# Patient Record
Sex: FEMALE | Race: WHITE | NOT HISPANIC OR LATINO | URBAN - METROPOLITAN AREA
[De-identification: names, ages, dates, MRNs, and addresses within clinical notes are randomized per-mention and may not be internally consistent; named-entity substitution may affect disease eponyms.]

---

## 2018-06-26 ENCOUNTER — APPOINTMENT (RX ONLY)
Dept: URBAN - METROPOLITAN AREA CLINIC 23 | Facility: CLINIC | Age: 58
Setting detail: DERMATOLOGY
End: 2018-06-26

## 2018-06-26 DIAGNOSIS — L82.0 INFLAMED SEBORRHEIC KERATOSIS: ICD-10-CM

## 2018-06-26 DIAGNOSIS — Z41.9 ENCOUNTER FOR PROCEDURE FOR PURPOSES OTHER THAN REMEDYING HEALTH STATE, UNSPECIFIED: ICD-10-CM

## 2018-06-26 PROCEDURE — 17110 DESTRUCTION B9 LES UP TO 14: CPT

## 2018-06-26 PROCEDURE — ? DYSPORT

## 2018-06-26 PROCEDURE — ? LIQUID NITROGEN

## 2018-06-26 PROCEDURE — ? FILLERS

## 2018-06-26 PROCEDURE — ? BENIGN DESTRUCTION

## 2018-06-26 ASSESSMENT — LOCATION SIMPLE DESCRIPTION DERM
LOCATION SIMPLE: RIGHT CHEEK
LOCATION SIMPLE: CHEST
LOCATION SIMPLE: INFERIOR FOREHEAD
LOCATION SIMPLE: ABDOMEN
LOCATION SIMPLE: RIGHT THIGH

## 2018-06-26 ASSESSMENT — LOCATION DETAILED DESCRIPTION DERM
LOCATION DETAILED: INFERIOR MID FOREHEAD
LOCATION DETAILED: RIGHT ANTERIOR DISTAL THIGH
LOCATION DETAILED: MIDDLE STERNUM
LOCATION DETAILED: RIGHT CENTRAL MALAR CHEEK
LOCATION DETAILED: RIGHT LATERAL ABDOMEN

## 2018-06-26 ASSESSMENT — LOCATION ZONE DERM
LOCATION ZONE: LEG
LOCATION ZONE: FACE
LOCATION ZONE: TRUNK

## 2018-06-26 NOTE — PROCEDURE: FILLERS
Dorsal Hands Filler  Volume In Cc: 0
Lot #: [id number] [facility]
Post-Care Instructions: Patient instructed to apply ice to reduce swelling.
Additional Area 1 Location: perioral fine lines, smile lines (cheeks), fine lines around eyes
Price (Use Numbers Only, No Special Characters Or $): 0.00
Expiration Date (Month Year): 11/30/2020
Additional Area 5 Location: perioral fine lines, glabella, cheeks, lateral mouth
Anesthesia Volume In Cc: 0.2
Additional Area 2 Location: cheeks
Additional Area 4 Location: fine line cheeks diluted with 0.2 lido
Consent: Written consent obtained. Risks include but not limited to bruising, beading, irregular texture, ulceration, infection, allergic reaction, scar formation, incomplete augmentation, temporary nature, procedural pain.
Lot #: [id number]
Map Statment: See [address] for Complete Details
Additional Area 3 Location: lateral eyes, nasal root
Additional Area 1 Location: lateral mouth, cheeks and lateral jawline
Additional Area 1 Volume In Cc: 1
Use Map Statement For Sites (Optional): No
Filler: Allison Padilla
Additional Area 3 Location: lateral mouth, cheeks
Additional Area 2 Location: lateral mouth,cheeks
Expiration Date (Month Year): 12/20/2019
Additional Anesthesia Volume In Cc: 6
Detail Level: Zone

## 2018-06-26 NOTE — PROCEDURE: BENIGN DESTRUCTION
Post-Care Instructions: I reviewed with the patient in detail post-care instructions. Patient is to wear sunprotection, and avoid picking at any of the treated lesions. Pt may apply Vaseline to crusted or scabbing areas.
Medical Necessity Information: It is in your best interest to select a reason for this procedure from the list below. All of these items fulfill various CMS LCD requirements except the new and changing color options.
Medical Necessity Clause: This procedure was medically necessary because the lesions that were treated were:
Add 52 Modifier (Optional): no
Anesthesia Volume In Cc: 0.5
Treatment Number (Will Not Render If 0): 0
Consent: The patient's consent was obtained including but not limited to risks of crusting, scabbing, blistering, scarring, darker or lighter pigmentary change, recurrence, incomplete removal and infection.
Detail Level: Zone

## 2018-06-26 NOTE — PROCEDURE: LIQUID NITROGEN
Detail Level: Zone
Include Z78.9 (Other Specified Conditions Influencing Health Status) As An Associated Diagnosis?: No
Medical Necessity Information: It is in your best interest to select a reason for this procedure from the list below. All of these items fulfill various CMS LCD requirements except the new and changing color options.
Post-Care Instructions: I reviewed with the patient in detail post-care instructions. Patient is to wear sunprotection, and avoid picking at any of the treated lesions. Pt may apply Vaseline to crusted or scabbing areas.
Medical Necessity Clause: This procedure was medically necessary because the lesions that were treated were:
Consent: The patient's consent was obtained including but not limited to risks of crusting, scabbing, blistering, scarring, darker or lighter pigmentary change, recurrence, incomplete removal and infection.

## 2018-06-26 NOTE — PROCEDURE: DYSPORT
Depressor Anguli Oris Units: 0
Glabellar Complex Units: [address]
Additional Area 2 Location: lateral eyes
Dilution (U/ 0.1cc): 1.5
Additional Area 4 Location: upper and lower lip cutaneous
Price (Use Numbers Only, No Special Characters Or $): 0.00
Additional Area 3 Location: chin
Lot #: [id number]
Detail Level: Zone
Additional Area 6 Location: lateral brow
Consent: Written consent obtained. Risks include but not limited to lid/brow ptosis, bruising, swelling, diplopia, temporary effect, incomplete chemical denervation.
Expiration Date (Month Year): 12/31/18

## 2018-10-17 ENCOUNTER — APPOINTMENT (RX ONLY)
Dept: URBAN - METROPOLITAN AREA CLINIC 23 | Facility: CLINIC | Age: 58
Setting detail: DERMATOLOGY
End: 2018-10-17

## 2018-10-17 DIAGNOSIS — Z41.9 ENCOUNTER FOR PROCEDURE FOR PURPOSES OTHER THAN REMEDYING HEALTH STATE, UNSPECIFIED: ICD-10-CM

## 2018-10-17 DIAGNOSIS — L82.0 INFLAMED SEBORRHEIC KERATOSIS: ICD-10-CM

## 2018-10-17 PROCEDURE — ? DYSPORT

## 2018-10-17 PROCEDURE — ? COUNSELING

## 2018-10-17 PROCEDURE — 17110 DESTRUCTION B9 LES UP TO 14: CPT

## 2018-10-17 PROCEDURE — ? BENIGN DESTRUCTION

## 2018-10-17 ASSESSMENT — LOCATION DETAILED DESCRIPTION DERM: LOCATION DETAILED: LEFT MEDIAL MALAR CHEEK

## 2018-10-17 ASSESSMENT — LOCATION SIMPLE DESCRIPTION DERM: LOCATION SIMPLE: LEFT CHEEK

## 2018-10-17 ASSESSMENT — LOCATION ZONE DERM: LOCATION ZONE: FACE

## 2018-10-17 NOTE — PROCEDURE: DYSPORT
Nasal Root Units: 0
Additional Area 3 Units: 4
Detail Level: Simple
Additional Area 2 Units: 10
Additional Area 4 Location: high forehead 2 cm above brows
Price (Use Numbers Only, No Special Characters Or $): 0.00
Additional Area 2 Location: lateral eyes
Dilution (U/ 0.1cc): 1.5
Additional Area 6 Location: aurea yañez
Additional Area 1 Location: chin
Glabellar Complex Units: [address]
Consent: Written consent obtained. Risks include but not limited to lid/brow ptosis, bruising, swelling, diplopia, temporary effect, incomplete chemical denervation.
Lot #: [id number]
Additional Area 1 Units: 6
Additional Area 3 Location: lateral brows
Expiration Date (Month Year): 05/31/2019

## 2018-10-17 NOTE — PROCEDURE: BENIGN DESTRUCTION
Render Post-Care Instructions In Note?: yes
Anesthesia Volume In Cc: 0.2
Treatment Number (Will Not Render If 0): 0
Consent: The patient's consent was obtained including but not limited to risks of crusting, scabbing, blistering, scarring, darker or lighter pigmentary change, recurrence, incomplete removal and infection.
Medical Necessity Information: It is in your best interest to select a reason for this procedure from the list below. All of these items fulfill various CMS LCD requirements except the new and changing color options.
Medical Necessity Clause: This procedure was medically necessary because the lesions that were treated were:
Post-Care Instructions: I reviewed with the patient in detail post-care instructions. Patient is to wear sunprotection, and avoid picking at any of the treated lesions. Pt may apply Vaseline to crusted or scabbing areas.
Detail Level: Simple
Add 52 Modifier (Optional): no

## 2018-10-17 NOTE — HPI: SKIN LESION
What Type Of Note Output Would You Prefer (Optional)?: Standard Output
How Severe Is Your Skin Lesion?: moderate
Is This A New Presentation, Or A Follow-Up?: Skin Lesion

## 2019-04-10 ENCOUNTER — APPOINTMENT (RX ONLY)
Dept: URBAN - METROPOLITAN AREA CLINIC 23 | Facility: CLINIC | Age: 59
Setting detail: DERMATOLOGY
End: 2019-04-10

## 2019-04-10 DIAGNOSIS — L82.0 INFLAMED SEBORRHEIC KERATOSIS: ICD-10-CM

## 2019-04-10 DIAGNOSIS — Z71.89 OTHER SPECIFIED COUNSELING: ICD-10-CM

## 2019-04-10 DIAGNOSIS — L72.0 EPIDERMAL CYST: ICD-10-CM

## 2019-04-10 DIAGNOSIS — Z41.9 ENCOUNTER FOR PROCEDURE FOR PURPOSES OTHER THAN REMEDYING HEALTH STATE, UNSPECIFIED: ICD-10-CM

## 2019-04-10 PROCEDURE — ? COUNSELING

## 2019-04-10 PROCEDURE — ? SUNSCREEN RECOMMENDATIONS

## 2019-04-10 PROCEDURE — 17110 DESTRUCTION B9 LES UP TO 14: CPT

## 2019-04-10 PROCEDURE — ? DYSPORT

## 2019-04-10 PROCEDURE — ? LIQUID NITROGEN

## 2019-04-10 PROCEDURE — ? ACNE SURGERY

## 2019-04-10 PROCEDURE — 10040 EXTRACTION: CPT

## 2019-04-10 ASSESSMENT — LOCATION DETAILED DESCRIPTION DERM
LOCATION DETAILED: LEFT LATERAL FOREHEAD
LOCATION DETAILED: RIGHT MID-UPPER BACK
LOCATION DETAILED: LEFT MID-UPPER BACK
LOCATION DETAILED: LEFT SUPERIOR MEDIAL FOREHEAD
LOCATION DETAILED: RIGHT MEDIAL FOREHEAD
LOCATION DETAILED: LEFT SUPERIOR FOREHEAD
LOCATION DETAILED: LEFT FOREHEAD

## 2019-04-10 ASSESSMENT — LOCATION SIMPLE DESCRIPTION DERM
LOCATION SIMPLE: LEFT FOREHEAD
LOCATION SIMPLE: LEFT UPPER BACK
LOCATION SIMPLE: RIGHT FOREHEAD
LOCATION SIMPLE: RIGHT UPPER BACK

## 2019-04-10 ASSESSMENT — LOCATION ZONE DERM
LOCATION ZONE: TRUNK
LOCATION ZONE: FACE

## 2019-04-10 NOTE — PROCEDURE: DYSPORT
Masseter Units: 0
Dilution (U/ 0.1cc): 1.5
Consent: Written consent obtained. Risks include but not limited to lid/brow ptosis, bruising, swelling, diplopia, temporary effect, incomplete chemical denervation.
Detail Level: Simple
Additional Area 3 Location: lateral eyelids
Additional Area 1 Units: 10
Additional Area 5 Location: chin
Additional Area 1 Location: lateral eyebrows
Expiration Date (Month Year): 10/31/2019
Additional Area 2 Location: lateral brows
Additional Area 4 Location: [facility]
Lot #: [id number]
Price (Use Numbers Only, No Special Characters Or $): 0.00
Glabellar Complex Units: 48

## 2019-04-10 NOTE — HPI: COSMETIC (FILLERS)
Have You Had Fillers Before?: has had fillers
Additional History: Pt denies history of cold sores.
When Was Your Last Filler Injection?: 6/26/18

## 2019-04-10 NOTE — PROCEDURE: ACNE SURGERY
Post-Care Instructions: I reviewed with the patient in detail post-care instructions. Patient is to keep the treatment areas dry overnight, and then apply bacitracin twice daily until healed. Patient may apply hydrogen peroxide soaks to remove any crusting.
Hemostasis: Pressure
Extraction Method: lancet and extractor
Detail Level: Detailed
Render Number Of Lesions Treated: yes
Prep Text (Optional): Prior to removal the treatment areas were prepped in the usual fashion.  1% lidocaine with epi was used to anesthetize the area
Consent was obtained and risks were reviewed including but not limited to scarring, infection, bleeding, scabbing, incomplete removal, and allergy to anesthesia.
Acne Type: Milial cysts
Render Post-Care Instructions In Note?: no

## 2019-04-10 NOTE — PROCEDURE: LIQUID NITROGEN
Duration Of Freeze Thaw-Cycle (Seconds): 5-10
Consent: The patient's consent was obtained including but not limited to risks of crusting, scabbing, blistering, scarring, darker or lighter pigmentary change, recurrence, incomplete removal and infection.
Render Post-Care Instructions In Note?: no
Detail Level: Simple
Post-Care Instructions: I reviewed with the patient in detail post-care instructions. Patient is to wear sunprotection, and avoid picking at any of the treated lesions. Pt may apply Vaseline to crusted or scabbing areas.
Medical Necessity Clause: This procedure was medically necessary because the lesions that were treated were:
Number Of Freeze-Thaw Cycles: 2 freeze-thaw cycles
Medical Necessity Information: It is in your best interest to select a reason for this procedure from the list below. All of these items fulfill various CMS LCD requirements except the new and changing color options.

## 2019-08-21 ENCOUNTER — APPOINTMENT (RX ONLY)
Dept: URBAN - METROPOLITAN AREA CLINIC 23 | Facility: CLINIC | Age: 59
Setting detail: DERMATOLOGY
End: 2019-08-21

## 2019-08-21 DIAGNOSIS — L82.0 INFLAMED SEBORRHEIC KERATOSIS: ICD-10-CM

## 2019-08-21 DIAGNOSIS — Z71.89 OTHER SPECIFIED COUNSELING: ICD-10-CM

## 2019-08-21 DIAGNOSIS — Z41.9 ENCOUNTER FOR PROCEDURE FOR PURPOSES OTHER THAN REMEDYING HEALTH STATE, UNSPECIFIED: ICD-10-CM

## 2019-08-21 DIAGNOSIS — L81.4 OTHER MELANIN HYPERPIGMENTATION: ICD-10-CM

## 2019-08-21 PROCEDURE — ? JEUVEAU (U OR CC)

## 2019-08-21 PROCEDURE — ? SUNSCREEN RECOMMENDATIONS

## 2019-08-21 PROCEDURE — ? COUNSELING

## 2019-08-21 PROCEDURE — ? FILLERS

## 2019-08-21 PROCEDURE — 17110 DESTRUCTION B9 LES UP TO 14: CPT

## 2019-08-21 PROCEDURE — ? BENIGN DESTRUCTION

## 2019-08-21 PROCEDURE — 99213 OFFICE O/P EST LOW 20 MIN: CPT | Mod: 25

## 2019-08-21 ASSESSMENT — LOCATION ZONE DERM
LOCATION ZONE: LEG
LOCATION ZONE: TRUNK

## 2019-08-21 ASSESSMENT — LOCATION DETAILED DESCRIPTION DERM
LOCATION DETAILED: RIGHT CLAVICULAR SKIN
LOCATION DETAILED: RIGHT DISTAL PRETIBIAL REGION
LOCATION DETAILED: LEFT DISTAL PRETIBIAL REGION
LOCATION DETAILED: LEFT MEDIAL SUPERIOR CHEST
LOCATION DETAILED: LEFT PROXIMAL PRETIBIAL REGION
LOCATION DETAILED: RIGHT PROXIMAL PRETIBIAL REGION

## 2019-08-21 ASSESSMENT — LOCATION SIMPLE DESCRIPTION DERM
LOCATION SIMPLE: CHEST
LOCATION SIMPLE: LEFT PRETIBIAL REGION
LOCATION SIMPLE: RIGHT PRETIBIAL REGION
LOCATION SIMPLE: RIGHT CLAVICULAR SKIN

## 2019-08-21 NOTE — PROCEDURE: FILLERS
Additional Area 2 Volume In Cc: 0
Filler: Restylane-L
Additional Area 4 Location: cheeks, lateral jawline, medial cheeks fine lines
Additional Area 4 Location: Perioral
Detail Level: Zone
Post-Care Instructions: Patient instructed to apply ice to reduce swelling.
Consent: Written consent obtained. Risks include but not limited to bruising, beading, irregular texture, ulceration, infection, allergic reaction, scar formation, incomplete augmentation, temporary nature, procedural pain.
Additional Area 5 Location: Cheeks, vermillion lips, marionette fine lines, glabellar fine lines, lateral mouth
Price (Use Numbers Only, No Special Characters Or $): 0.00
Use Map Statement For Sites (Optional): No
Lot #: [id number]
Lot #: [id number]
Lot #: [id number]
Map Statment: See [address] for Complete Details
Expiration Date (Month Year): 08/31/2019
Expiration Date (Month Year): 2021-12-31
Expiration Date (Month Year): 09/2021
Anesthesia Type: 1% lidocaine without epinephrine
Include Cannula Brand?: DermaSculpt
Include Cannula Size?: 25G
Additional Area 1 Location: lateral jawline, lateral mouth, marionette lines
Additional Anesthesia Volume In Cc: 6
Additional Area 1 Location: lateral mouth, perioral fine lines, vermillion lips, marionette lines
Additional Area 1 Location: lateral mouth, fine lines perioral, marionette lines, lateral cheeks, vermillion lips
Include Cannula Length?: 1.5 inch
Additional Area 1 Volume In Cc: 1
Additional Area 2 Location: Lips, oral commissure, glabellar fine fines
Additional Area 2 Location: Nasalabial, Glabellar fine lines- Complimentary
Additional Area 2 Location: cheeks, jawline, oral commissure
Additional Area 3 Location: Glabbellar fine lines, Nasalabial folds, Marionette lines, vermillion lip

## 2019-08-21 NOTE — PROCEDURE: BENIGN DESTRUCTION
Add 52 Modifier (Optional): no
Medical Necessity Information: It is in your best interest to select a reason for this procedure from the list below. All of these items fulfill various CMS LCD requirements except the new and changing color options.
Consent: The patient's consent was obtained including but not limited to risks of crusting, scabbing, blistering, scarring, darker or lighter pigmentary change, recurrence, incomplete removal and infection.
Medical Necessity Clause: This procedure was medically necessary because the lesions that were treated were:
Anesthesia Volume In Cc: 0.4
Post-Care Instructions: I reviewed with the patient in detail post-care instructions. Patient is to wear sunprotection, and avoid picking at any of the treated lesions. Pt may apply Vaseline to crusted or scabbing areas.
Render Post-Care Instructions In Note?: yes
Detail Level: Simple
Treatment Number (Will Not Render If 0): 0
Anesthesia Type: 1% Xylocaine with epinephrine

## 2019-08-21 NOTE — PROCEDURE: JEUVEAU (U OR CC)
Levator Labii Superioris Units: 0
Topical Anesthesia?: 20% benzocaine, 8% lidocaine, and 8% tetracaine
Detail Level: Detailed
Additional Area 1 Units: [address]
Length Of Topical Anesthesia Application (Optional): 15 minutes
Including Pricing Information In The Note: No
Consent: Written consent obtained. Risks include but not limited to lid/brow ptosis, bruising, swelling, diplopia, temporary effect, incomplete chemical denervation.
Additional Area 3 Units: 6
Dilution (U/0.1 Cc): 2.5
Post-Care Instructions: Patient instructed to not lie down for 4 hours and limit physical activity for 24 hours.
Additional Area 2 Units: 2
Additional Area 3 Location: upper forehead 3 cm above brows
Lot #: [id number]
Additional Area 1 Location: glabella
Additional Area 2 Location: lateral brows
Document As Units Or Cc?: units

## 2020-02-25 ENCOUNTER — APPOINTMENT (RX ONLY)
Dept: URBAN - METROPOLITAN AREA CLINIC 23 | Facility: CLINIC | Age: 60
Setting detail: DERMATOLOGY
End: 2020-02-25

## 2020-02-25 DIAGNOSIS — Z41.9 ENCOUNTER FOR PROCEDURE FOR PURPOSES OTHER THAN REMEDYING HEALTH STATE, UNSPECIFIED: ICD-10-CM

## 2020-02-25 PROCEDURE — ? FILLERS

## 2020-02-25 PROCEDURE — ? DYSPORT

## 2020-02-25 NOTE — PROCEDURE: DYSPORT
Forehead Units: 0
Additional Area 4 Location: 2cm high forehead
Dilution (U/ 0.1cc): 1.5
Length Of Topical Anesthesia Application (Optional): 15 minutes
Consent: Written consent obtained. Risks include but not limited to lid/brow ptosis, bruising, swelling, diplopia, temporary effect, incomplete chemical denervation.
Additional Area 3 Location: high forehead 3 cm above brows
Topical Anesthesia?: 20% benzocaine, 8% lidocaine, 4% tetracaine
Additional Area 1 Location: glabella
Additional Area 2 Location: lateral brows
Price (Use Numbers Only, No Special Characters Or $): 0.00
Detail Level: Simple
Lot #: [id number]
Additional Area 1 Units: 48
Additional Area 2 Units: 10
Expiration Date (Month Year): 09/30/20

## 2020-02-25 NOTE — PROCEDURE: FILLERS
Expiration Date (Month Year): 2022-06-30
Brows Filler  Volume In Cc: 0
Expiration Date (Month Year): 09/2021
Expiration Date (Month Year): 08/31/2019
Include Cannula Information In Note?: No
Additional Anesthesia Volume In Cc: 6
Include Cannula Brand?: DermaSculpt
Include Cannula Size?: 25G
Additional Area 1 Location: lateral cheeks, lateral jawline, lateral mouth, marionette lines
Include Cannula Length?: 1.5 inch
Consent: Written consent obtained. Risks include but not limited to bruising, beading, irregular texture, ulceration, infection, allergic reaction, scar formation, incomplete augmentation, temporary nature, procedural pain.
Detail Level: Zone
Additional Area 1 Location: lateral mouth, perioral fine lines, vermillion lips, marionette lines
Additional Area 1 Volume In Cc: 1
Additional Area 1 Location: lateral mouth, fine lines perioral, marionette lines, lateral cheeks, vermillion lips
Additional Area 2 Location: cheeks, jawline, oral commissure
Post-Care Instructions: Patient instructed to apply ice to reduce swelling.
Price (Use Numbers Only, No Special Characters Or $): 0.00
Additional Area 2 Location: Lips, oral commissure, glabellar fine fines
Additional Area 2 Location: Nasalabial, Glabellar fine lines- Complimentary
Filler: Restylane-L
Additional Area 3 Location: Glabbellar fine lines, Nasalabial folds, Marionette lines, vermillion lip
Map Statment: See [address] for Complete Details
Additional Area 4 Location: lateral jawline, lateral mouth, glabella lines,
Additional Area 4 Location: Perioral
Additional Area 5 Location: Cheeks, vermillion lips, marionette fine lines, glabellar fine lines, lateral mouth
Anesthesia Type: 1% lidocaine without epinephrine
Lot #: [id number]
Lot #: [id number]
Lot #: [id number]

## 2020-08-11 ENCOUNTER — APPOINTMENT (RX ONLY)
Dept: URBAN - METROPOLITAN AREA CLINIC 23 | Facility: CLINIC | Age: 60
Setting detail: DERMATOLOGY
End: 2020-08-11

## 2020-08-11 DIAGNOSIS — Z41.9 ENCOUNTER FOR PROCEDURE FOR PURPOSES OTHER THAN REMEDYING HEALTH STATE, UNSPECIFIED: ICD-10-CM

## 2020-08-11 DIAGNOSIS — D485 NEOPLASM OF UNCERTAIN BEHAVIOR OF SKIN: ICD-10-CM

## 2020-08-11 DIAGNOSIS — L82.0 INFLAMED SEBORRHEIC KERATOSIS: ICD-10-CM

## 2020-08-11 DIAGNOSIS — L81.4 OTHER MELANIN HYPERPIGMENTATION: ICD-10-CM

## 2020-08-11 PROBLEM — D48.5 NEOPLASM OF UNCERTAIN BEHAVIOR OF SKIN: Status: ACTIVE | Noted: 2020-08-11

## 2020-08-11 PROCEDURE — ? BENIGN DESTRUCTION

## 2020-08-11 PROCEDURE — ? DYSPORT

## 2020-08-11 PROCEDURE — 99214 OFFICE O/P EST MOD 30 MIN: CPT | Mod: 25

## 2020-08-11 PROCEDURE — 11102 TANGNTL BX SKIN SINGLE LES: CPT | Mod: 59

## 2020-08-11 PROCEDURE — ? BIOPSY BY SHAVE METHOD

## 2020-08-11 PROCEDURE — ? COUNSELING

## 2020-08-11 PROCEDURE — ? LIQUID NITROGEN

## 2020-08-11 PROCEDURE — ? SUNSCREEN RECOMMENDATIONS

## 2020-08-11 PROCEDURE — 17110 DESTRUCTION B9 LES UP TO 14: CPT

## 2020-08-11 PROCEDURE — 11103 TANGNTL BX SKIN EA SEP/ADDL: CPT | Mod: 59

## 2020-08-11 ASSESSMENT — LOCATION SIMPLE DESCRIPTION DERM
LOCATION SIMPLE: RIGHT SHOULDER
LOCATION SIMPLE: ABDOMEN
LOCATION SIMPLE: RIGHT PRETIBIAL REGION
LOCATION SIMPLE: RIGHT NOSE
LOCATION SIMPLE: LEFT POSTERIOR THIGH

## 2020-08-11 ASSESSMENT — LOCATION DETAILED DESCRIPTION DERM
LOCATION DETAILED: RIGHT NASAL SIDEWALL
LOCATION DETAILED: LEFT PROXIMAL POSTERIOR THIGH
LOCATION DETAILED: RIGHT LATERAL ABDOMEN
LOCATION DETAILED: EPIGASTRIC SKIN
LOCATION DETAILED: LEFT LATERAL ABDOMEN
LOCATION DETAILED: RIGHT DISTAL PRETIBIAL REGION
LOCATION DETAILED: RIGHT POSTERIOR SHOULDER

## 2020-08-11 ASSESSMENT — LOCATION ZONE DERM
LOCATION ZONE: LEG
LOCATION ZONE: ARM
LOCATION ZONE: TRUNK
LOCATION ZONE: NOSE

## 2020-08-11 NOTE — PROCEDURE: BIOPSY BY SHAVE METHOD
Detail Level: Detailed
Depth Of Biopsy: dermis
Was A Bandage Applied: Yes
Size Of Lesion In Cm: 0
Biopsy Type: H and E
Biopsy Method: 15 blade
Anesthesia Type: 1% lidocaine without epinephrine
Anesthesia Volume In Cc (Will Not Render If 0): 0.3
Hemostasis: Electrocautery
Wound Care: Vaseline
Dressing: bandage
Destruction After The Procedure: No
Type Of Destruction Used: Curettage
Curettage Text: The wound bed was treated with curettage after the biopsy was performed.
Cryotherapy Text: The wound bed was treated with cryotherapy after the biopsy was performed.
Electrodesiccation Text: The wound bed was treated with electrodesiccation after the biopsy was performed.
Electrodesiccation And Curettage Text: The wound bed was treated with electrodesiccation and curettage after the biopsy was performed.
Silver Nitrate Text: The wound bed was treated with silver nitrate after the biopsy was performed.
Lab: [facility]
Lab Facility: [id number]
Consent: The provider's intent is to obtain a tissue sample solely for diagnostic purposes. Written consent to obtain tissue sample was obtained and risks were reviewed including but not limited to scarring, infection, bleeding, scabbing, incomplete removal, nerve damage and allergy to anesthesia.
Post-Care Instructions: I reviewed with the patient in detail post-care instructions. Patient is to keep the biopsy site dry overnight, and then apply bacitracin twice daily until healed. Patient may apply hydrogen peroxide soaks to remove any crusting.
Notification Instructions: Patient will be notified of biopsy results. However, patient instructed to call the office if not contacted within 2 weeks.
Billing Type: [employer]
Information: Selecting Yes will display possible errors in your note based on the variables you have selected. This validation is only offered as a suggestion for you. PLEASE NOTE THAT THE VALIDATION TEXT WILL BE REMOVED WHEN YOU FINALIZE YOUR NOTE. IF YOU WANT TO FAX A PRELIMINARY NOTE YOU WILL NEED TO TOGGLE THIS TO 'NO' IF YOU DO NOT WANT IT IN YOUR FAXED NOTE.
Lab: [id number]
Lab Facility: [id number]
Consent: The provider's intent is to obtain a tissue sample solely for diagnostic purposes.  Written consent to obtain tissue sample was obtained and risks were reviewed including but not limited to scarring, infection, bleeding, scabbing, incomplete removal, nerve damage and allergy to anesthesia.
Billing Type: Third-Party Bill

## 2020-08-11 NOTE — PROCEDURE: MIPS QUALITY
Quality 130: Documentation Of Current Medications In The Medical Record: Eligible clinician attests to documenting in the medical record the patient is not eligible for a current list of medications being obtained, updated, or reviewed by the eligible clinician
Detail Level: Detailed
Additional Notes: Pt has no current meds

## 2020-08-11 NOTE — PROCEDURE: LIQUID NITROGEN
Detail Level: Simple
Consent: The patient's consent was obtained including but not limited to risks of crusting, scabbing, blistering, scarring, darker or lighter pigmentary change, recurrence, incomplete removal and infection.
Add 52 Modifier (Optional): no
Medical Necessity Information: It is in your best interest to select a reason for this procedure from the list below. All of these items fulfill various CMS LCD requirements except the new and changing color options.
Number Of Freeze-Thaw Cycles: 2 freeze-thaw cycles
Medical Necessity Clause: This procedure was medically necessary because the lesions that were treated were:
Duration Of Freeze Thaw-Cycle (Seconds): 5-10
Post-Care Instructions: I reviewed with the patient in detail post-care instructions. Patient is to wear sunprotection, and avoid picking at any of the treated lesions. Pt may apply Vaseline to crusted or scabbing areas.

## 2020-08-11 NOTE — PROCEDURE: DYSPORT
Price (Use Numbers Only, No Special Characters Or $): 0.00
Topical Anesthesia?: 20% benzocaine, 8% lidocaine, 4% tetracaine
Forehead Units: 0
Show Nasal Units: Yes
Additional Area 5 Location: glabella
Show Ucl Units: No
Consent: Written consent obtained. Risks include but not limited to lid/brow ptosis, bruising, swelling, diplopia, temporary effect, incomplete chemical denervation.
Additional Area 1 Units: [address]
Additional Area 2 Location: lateral brows
Additional Area 2 Units: 10
Lot #: [id number]
Additional Area 3 Location: high forehead 3 cm above brows
Dilution (U/ 0.1cc): 1.5
Length Of Topical Anesthesia Application (Optional): 15 minutes
Detail Level: Simple
Additional Area 4 Location: 2cm high forehead
Expiration Date (Month Year): 09/30/20

## 2020-08-11 NOTE — PROCEDURE: BENIGN DESTRUCTION
Render Note In Bullet Format When Appropriate: No
Medical Necessity Clause: This procedure was medically necessary because the lesions that were treated were:
Medical Necessity Information: It is in your best interest to select a reason for this procedure from the list below. All of these items fulfill various CMS LCD requirements except the new and changing color options.
Treatment Number (Will Not Render If 0): 0
Consent: The patient's consent was obtained including but not limited to risks of crusting, scabbing, blistering, scarring, darker or lighter pigmentary change, recurrence, incomplete removal and infection.
Anesthesia Volume In Cc: 0.3
Post-Care Instructions: I reviewed with the patient in detail post-care instructions. Patient is to wear sunprotection, and avoid picking at any of the treated lesions. Pt may apply Vaseline to crusted or scabbing areas.
Detail Level: Simple
Render Post-Care Instructions In Note?: yes

## 2020-12-07 ENCOUNTER — APPOINTMENT (RX ONLY)
Dept: URBAN - METROPOLITAN AREA CLINIC 23 | Facility: CLINIC | Age: 60
Setting detail: DERMATOLOGY
End: 2020-12-07

## 2020-12-07 DIAGNOSIS — Z41.9 ENCOUNTER FOR PROCEDURE FOR PURPOSES OTHER THAN REMEDYING HEALTH STATE, UNSPECIFIED: ICD-10-CM

## 2020-12-07 PROCEDURE — ? COOLSCULPTING

## 2020-12-07 NOTE — PROCEDURE: COOLSCULPTING
Location 1: flank (right)
Time (Minutes - Will Only Render If Nonzero): [email or web address]
Intro: Prior to treatment, the area was cleaned with alcohol and marked out with a marking pen. The gel sheet was then applied uniformly. The applicator was applied to the skin with good contact and suction.
Time (Minutes - Will Only Render If Nonzero): 35
Applicator Size: [address]
Suction Settings: The suction settings were per protocol.
Applicator Size: CoolAdvantage Petite
Post Treatment: After treatment, the suction was turned off, and the applicator was removed from the skin.
Applicator Size: CoolAdvantage Curve
Device: Coolsculpting
Applicator Size: CoolAdvantage Petite Core
Location 2: lower abdomen (left)
Consent: Written consent obtained, risks reviewed including, but not limited to, blistering from suction, darker or lighter pigmentary change, bruising, and/or need for multiple treatments.
Location 3: flank (left)
Detail Level: Zone
Location 4: lower abdomen (right)

## 2021-02-03 ENCOUNTER — APPOINTMENT (RX ONLY)
Dept: URBAN - METROPOLITAN AREA CLINIC 23 | Facility: CLINIC | Age: 61
Setting detail: DERMATOLOGY
End: 2021-02-03

## 2021-02-03 VITALS — TEMPERATURE: 96.4 F

## 2021-02-03 DIAGNOSIS — L57.8 OTHER SKIN CHANGES DUE TO CHRONIC EXPOSURE TO NONIONIZING RADIATION: ICD-10-CM

## 2021-02-03 DIAGNOSIS — L72.0 EPIDERMAL CYST: ICD-10-CM

## 2021-02-03 DIAGNOSIS — L82.0 INFLAMED SEBORRHEIC KERATOSIS: ICD-10-CM

## 2021-02-03 DIAGNOSIS — Z41.9 ENCOUNTER FOR PROCEDURE FOR PURPOSES OTHER THAN REMEDYING HEALTH STATE, UNSPECIFIED: ICD-10-CM

## 2021-02-03 DIAGNOSIS — L81.4 OTHER MELANIN HYPERPIGMENTATION: ICD-10-CM

## 2021-02-03 PROCEDURE — ? SUNSCREEN RECOMMENDATIONS

## 2021-02-03 PROCEDURE — ? DYSPORT

## 2021-02-03 PROCEDURE — ? ACNE SURGERY

## 2021-02-03 PROCEDURE — ? BENIGN DESTRUCTION

## 2021-02-03 PROCEDURE — ? COUNSELING

## 2021-02-03 PROCEDURE — ? LIQUID NITROGEN

## 2021-02-03 PROCEDURE — 10040 EXTRACTION: CPT

## 2021-02-03 PROCEDURE — ? IN-HOUSE DISPENSING PHARMACY

## 2021-02-03 PROCEDURE — 99213 OFFICE O/P EST LOW 20 MIN: CPT | Mod: 25

## 2021-02-03 PROCEDURE — 17110 DESTRUCTION B9 LES UP TO 14: CPT

## 2021-02-03 ASSESSMENT — LOCATION DETAILED DESCRIPTION DERM
LOCATION DETAILED: RIGHT INFERIOR ANTERIOR NECK
LOCATION DETAILED: INFERIOR MID FOREHEAD
LOCATION DETAILED: RIGHT PROXIMAL PRETIBIAL REGION
LOCATION DETAILED: MIDDLE STERNUM
LOCATION DETAILED: LEFT INFERIOR LATERAL NECK
LOCATION DETAILED: LOWER STERNUM

## 2021-02-03 ASSESSMENT — LOCATION ZONE DERM
LOCATION ZONE: FACE
LOCATION ZONE: LEG
LOCATION ZONE: TRUNK
LOCATION ZONE: NECK

## 2021-02-03 ASSESSMENT — LOCATION SIMPLE DESCRIPTION DERM
LOCATION SIMPLE: CHEST
LOCATION SIMPLE: RIGHT PRETIBIAL REGION
LOCATION SIMPLE: INFERIOR FOREHEAD
LOCATION SIMPLE: LEFT ANTERIOR NECK
LOCATION SIMPLE: RIGHT ANTERIOR NECK

## 2021-02-03 NOTE — PROCEDURE: ACNE SURGERY
Render Post-Care Instructions In Note?: no
Post-Care Instructions: I reviewed with the patient in detail post-care instructions. Patient is to keep the treatment areas dry overnight, and then apply bacitracin twice daily until healed. Patient may apply hydrogen peroxide soaks to remove any crusting.
Acne Type: Comedonal Lesions
Detail Level: Detailed
Consent was obtained and risks were reviewed including but not limited to scarring, infection, bleeding, scabbing, incomplete removal, and allergy to anesthesia.
Prep Text (Optional): Prior to removal the treatment areas were prepped in the usual fashion.  1% lidocaine with epi was used to anesthetize the area
Hemostasis: Pressure
Extraction Method: lancet and q-tip

## 2021-02-03 NOTE — PROCEDURE: IN-HOUSE DISPENSING PHARMACY
Name Of Product 8: TNS Essential
Product 30 Refills: 0
Product 38 Unit Type: mg
Product 12 Application Directions: Apply a pea size amount to the entire face at bedtime.
Product 3 Application Directions: Apply a pea size amount to the entire face at night
Product 1 Refills: 1
Product 12 Unit Type: bottle(s)
Product 1 Price/Unit (In Dollars): 0.00
Product 8 Application Directions: Apply to the clean face in the AM and PM daily
Name Of Product 13: Skin Better Even tone correcting serum
Product 14 Unit Type: tube(s)
Name Of Product 19: Diazepam 5mg
Name Of Product 3: Tretinoin 0.05% cream
Product 2 Unit Type: tablets
Name Of Product 9: Clearing tone pads
Product 13 Application Directions: Apply to the entire face in the Am and pm
Product 2 Application Directions: apply to affected areas left lower leg am and pm  as indicated
Name Of Product 7: Latisse 5ml
Send Charges To Patient Encounter: No
Product 11 Application Directions: Apply a thin layer to the acne prone areas in the AM
Product 4 Application Directions: Take one tablet half hour today prior to procedure as indicated.  Dispense in office
Detail Level: Zone
Product 7 Application Directions: Apply to lashes at bedtime daily as indicated
Name Of Product 12: Retinol Lite
Name Of Product 4: Valium 5 mg
Product 7 Unit Type: ml
Product 3 Unit Type: grams
Name Of Product 6: Prednisone 20mg
Product 10 Application Directions: Take 1 tablet 2 times daily for 3 days
Product 21 Application Directions: Take one tablet 30 minutes prior to procedure
Name Of Product 1: Hydroquinone 4% / Tretinoin 0.05% / Fluocinolone 0.01%
Product 6 Application Directions: Take one tablet for swelling today at the office as indicated and one tablet tomorrow
Name Of Product 11: Clearing gel
Name Of Product 21: Valium 5mg
Product 2 Amount/Unit (Numbers Only): 6
Name Of Product 5: loratadine 10 mg
Product 22 Application Directions: Apply to the affected area of the face
Product 9 Application Directions: Apply to the acne prone areas in the Am and Pm
Name Of Product 14: Skin Better Interfuse eye treatment cream
Name Of Product 2: Valtrex
Product 9 Unit Type: jar(s)
Product 7 Amount/Unit (Numbers Only): 5
Product 5 Application Directions: Take one tablet today after procedure with full glass of water as indicated
Name Of Product 10: Valtrex 500 mg
Name Of Product 22: Brightening Pads
Product 14 Application Directions: Apply to the under eyes in the Am and pm
Product 1 Application Directions: Apply thin layer to right cheek every night  at bedtime only.
Product 3 Amount/Unit (Numbers Only): [facility]

## 2021-02-03 NOTE — PROCEDURE: DYSPORT
Show Forehead Units: Yes
Additional Area 1 Location: glabella
R Brow Units: 0
Dilution (U/ 0.1cc): 1.5
Show Ucl Units: No
Additional Area 1 Units: [address]
Consent: Written consent obtained. Risks include but not limited to lid/brow ptosis, bruising, swelling, diplopia, temporary effect, incomplete chemical denervation.
Additional Area 4 Location: 2cm high forehead
Detail Level: Simple
Additional Area 2 Location: lateral brows
Expiration Date (Month Year): 09/30/21
Lot #: [id number]
Additional Area 2 Units: 10
Price (Use Numbers Only, No Special Characters Or $): 0.00
Additional Area 3 Location: high forehead 3 cm above brows

## 2021-02-03 NOTE — PROCEDURE: LIQUID NITROGEN
Include Z78.9 (Other Specified Conditions Influencing Health Status) As An Associated Diagnosis?: No
Detail Level: Simple
Consent: The patient's consent was obtained including but not limited to risks of crusting, scabbing, blistering, scarring, darker or lighter pigmentary change, recurrence, incomplete removal and infection.
Duration Of Freeze Thaw-Cycle (Seconds): 5-10
Medical Necessity Information: It is in your best interest to select a reason for this procedure from the list below. All of these items fulfill various CMS LCD requirements except the new and changing color options.
Post-Care Instructions: I reviewed with the patient in detail post-care instructions. Patient is to wear sunprotection, and avoid picking at any of the treated lesions. Pt may apply Vaseline to crusted or scabbing areas.
Number Of Freeze-Thaw Cycles: 2 freeze-thaw cycles
Medical Necessity Clause: This procedure was medically necessary because the lesions that were treated were:

## 2021-02-03 NOTE — PROCEDURE: BENIGN DESTRUCTION
Render Note In Bullet Format When Appropriate: No
Medical Necessity Clause: This procedure was medically necessary because the lesions that were treated were:
Treatment Number (Will Not Render If 0): 0
Detail Level: Simple
Consent: The patient's consent was obtained including but not limited to risks of crusting, scabbing, blistering, scarring, darker or lighter pigmentary change, recurrence, incomplete removal and infection.
Anesthesia Volume In Cc: 0.3
Post-Care Instructions: I reviewed with the patient in detail post-care instructions. Patient is to wear sunprotection, and avoid picking at any of the treated lesions. Pt may apply Vaseline to crusted or scabbing areas.
Render Post-Care Instructions In Note?: yes
Medical Necessity Information: It is in your best interest to select a reason for this procedure from the list below. All of these items fulfill various CMS LCD requirements except the new and changing color options.

## 2021-02-12 ENCOUNTER — APPOINTMENT (RX ONLY)
Dept: URBAN - METROPOLITAN AREA CLINIC 23 | Facility: CLINIC | Age: 61
Setting detail: DERMATOLOGY
End: 2021-02-12

## 2021-02-12 DIAGNOSIS — Z41.9 ENCOUNTER FOR PROCEDURE FOR PURPOSES OTHER THAN REMEDYING HEALTH STATE, UNSPECIFIED: ICD-10-CM

## 2021-02-12 PROCEDURE — ? COOLSCULPTING

## 2021-02-12 NOTE — PROCEDURE: COOLSCULPTING
Location 4: flank (right)
Time (Minutes - Will Only Render If Nonzero): [address]
Applicator Size: CoolAdvantage Petite Core
Intro: Prior to treatment, the area was cleaned with alcohol and marked out with a marking pen. The gel sheet was then applied uniformly. The applicator was applied to the skin with good contact and suction.
Applicator Size: CoolAdvantage Curve
Suction Settings: The suction settings were per protocol.
Location 3: flank (left)
Time (Minutes - Will Only Render If Nonzero): 35
Applicator Size: [address]
Device: Coolsculpting
Consent: Written consent obtained, risks reviewed including, but not limited to, blistering from suction, darker or lighter pigmentary change, bruising, and/or need for multiple treatments.
Location 2: lower abdomen
Detail Level: Zone
Post Treatment: After treatment, the suction was turned off, and the applicator was removed from the skin.
Applicator Size: CoolAdvantage Petite
Location 1: upper abdomen

## 2021-07-27 ENCOUNTER — APPOINTMENT (RX ONLY)
Dept: URBAN - METROPOLITAN AREA CLINIC 23 | Facility: CLINIC | Age: 61
Setting detail: DERMATOLOGY
End: 2021-07-27

## 2021-07-27 DIAGNOSIS — Z85.828 PERSONAL HISTORY OF OTHER MALIGNANT NEOPLASM OF SKIN: ICD-10-CM

## 2021-07-27 DIAGNOSIS — Z41.9 ENCOUNTER FOR PROCEDURE FOR PURPOSES OTHER THAN REMEDYING HEALTH STATE, UNSPECIFIED: ICD-10-CM

## 2021-07-27 DIAGNOSIS — L82.0 INFLAMED SEBORRHEIC KERATOSIS: ICD-10-CM

## 2021-07-27 DIAGNOSIS — L81.4 OTHER MELANIN HYPERPIGMENTATION: ICD-10-CM

## 2021-07-27 PROCEDURE — ? DYSPORT

## 2021-07-27 PROCEDURE — ? FILLERS

## 2021-07-27 PROCEDURE — 17110 DESTRUCTION B9 LES UP TO 14: CPT

## 2021-07-27 PROCEDURE — ? COUNSELING

## 2021-07-27 PROCEDURE — 99212 OFFICE O/P EST SF 10 MIN: CPT | Mod: 25

## 2021-07-27 PROCEDURE — ? SUNSCREEN RECOMMENDATIONS

## 2021-07-27 PROCEDURE — ? BENIGN DESTRUCTION

## 2021-07-27 ASSESSMENT — LOCATION ZONE DERM
LOCATION ZONE: TRUNK
LOCATION ZONE: LEG
LOCATION ZONE: FACE

## 2021-07-27 ASSESSMENT — LOCATION SIMPLE DESCRIPTION DERM
LOCATION SIMPLE: LEFT CHEEK
LOCATION SIMPLE: LEFT THIGH
LOCATION SIMPLE: LEFT BREAST

## 2021-07-27 ASSESSMENT — LOCATION DETAILED DESCRIPTION DERM
LOCATION DETAILED: LEFT MEDIAL BREAST 10-11:00 REGION
LOCATION DETAILED: LEFT ANTERIOR DISTAL THIGH
LOCATION DETAILED: LEFT INFERIOR MEDIAL MALAR CHEEK

## 2021-07-27 NOTE — PROCEDURE: FILLERS
Mid Face Filler  Volume In Cc: 0
Price (Use Numbers Only, No Special Characters Or $): 0.00
Post-Care Instructions: Patient instructed to apply ice to reduce swelling.
Include Cannula Size?: 25G
Lot #: [id number]
Detail Level: Zone
Filler: Restylane-L
Consent: Written consent obtained. Risks include but not limited to bruising, beading, irregular texture, ulceration, infection, allergic reaction, scar formation, incomplete augmentation, temporary nature, procedural pain.
Additional Area 2 Location: cheeks, jawline, oral commissure
Additional Area 1 Volume In Cc: 1
Include Cannula Information In Note?: No
Additional Area 4 Location: Perioral
Additional Area 1 Location: lateral cheeks, lateral mouth, marionette lines
Expiration Date (Month Year): 2024-01-31
Additional Anesthesia Volume In Cc: 6
Additional Area 2 Location: Nasalabial, Glabellar fine lines- Complimentary
Additional Area 3 Location: Glabbellar fine lines, Nasalabial folds, Marionette lines, vermillion lip
Lot #: [id number]
Additional Area 1 Location: lateral mouth, fine lines perioral, marionette lines, lateral cheeks, vermillion lips
Additional Area 2 Location: Lips, oral commissure, glabellar fine fines
Additional Area 5 Location: Cheeks, vermillion lips, marionette fine lines, glabellar fine lines, lateral mouth
Anesthesia Type: 1% lidocaine without epinephrine
Additional Area 1 Location: lateral mouth, perioral fine lines, vermillion lips, marionette lines
Additional Area 4 Location: lateral jawline, lateral mouth, glabella lines,
Include Cannula Brand?: DermaSculpt
Map Statment: See [address] for Complete Details
Expiration Date (Month Year): 08/31/2019
Include Cannula Length?: 1.5 inch
Lot #: [id number]
Expiration Date (Month Year): 09/2021

## 2021-07-27 NOTE — PROCEDURE: BENIGN DESTRUCTION
Medical Necessity Information: It is in your best interest to select a reason for this procedure from the list below. All of these items fulfill various CMS LCD requirements except the new and changing color options.
Render Note In Bullet Format When Appropriate: No
Medical Necessity Clause: This procedure was medically necessary because the lesions that were treated were:
Consent: The patient's consent was obtained including but not limited to risks of crusting, scabbing, blistering, scarring, darker or lighter pigmentary change, recurrence, incomplete removal and infection.
Render Post-Care Instructions In Note?: yes
Anesthesia Volume In Cc: 0.3
Post-Care Instructions: I reviewed with the patient in detail post-care instructions. Patient is to wear sunprotection, and avoid picking at any of the treated lesions. Pt may apply Vaseline to crusted or scabbing areas.
Treatment Number (Will Not Render If 0): 0
Anesthesia Type: 1% Xylocaine with [id number] epinephrine and sodium bicarbonate
Detail Level: Simple

## 2021-07-27 NOTE — PROCEDURE: DYSPORT
Pharmacist Admission Medication Reconciliation Pending Note    Prior to Admission Medications were reviewed by the pharmacist and pended for provider review during admission medication reconciliation.    Medications were pended by the pharmacist at this time as follows:    Pended Admission Order Reconciliation Actions - Racheal Finney [facility] [place] 12/18/2019  3:22 PM 1. review lipitor dosing  2. lisinopril not pended due to bp    Order Name Action Reordered As    aspirin (ECOTRIN) 81 MG EC tablet Order for Admission aspirin (ECOTRIN) enteric coated tablet 81 mg    levothyroxine (SYNTHROID, LEVOTHROID) 100 MCG tablet Order for Admission levothyroxine (SYNTHROID, LEVOTHROID) tablet 100 mcg    clopidogrel (PLAVIX) 75 MG tablet Order for Admission clopidogrel (PLAVIX) tablet 75 mg    docusate calcium (SURFAK) 240 MG capsule Order for Admission docusate sodium (COLACE) capsule 200 mg    Psyllium (METAMUCIL PO) Do Not Order for Admission             Orders Pended To Continue For Hospital Stay     ID Description Pended By When Reason    [id number] aspirin (ECOTRIN) enteric coated tablet 81 mg-NIGHTLY [facility] 12/18/19 1522     [id number] clopidogrel (PLAVIX) tablet 75 mg-DAILY [facility] 12/18/19 1522     [id number] docusate sodium (COLACE) capsule 200 mg-NIGHTLY [facility] 12/18/19 1522     [id number] levothyroxine (SYNTHROID, LEVOTHROID) tablet 100 mcg-DAILY BEFORE BREAKFAST [facility] 12/18/19 1522             Pharmacist Notations:           Orders that are ultimately reconciled and signed during admission medication reconciliation may differ from the pended actions above.    Please contact the pharmacist for questions.    Racheal Finney [facility] [place]  12/18/2019 3:22 PM  
Show Right And Left Brow Units: No
Additional Area 4 Location: 2cm high forehead
Lcl Root Units: 0
Expiration Date (Month Year): 03/31/22
Show Additional Area 5: Yes
Additional Area 1 Units: 48
Additional Area 1 Location: glabella
Topical Anesthesia?: 20% benzocaine, 8% lidocaine, 4% tetracaine
Consent: Written consent obtained. Risks include but not limited to lid/brow ptosis, bruising, swelling, diplopia, temporary effect, incomplete chemical denervation.
Additional Area 3 Location: high forehead 3 cm above brows
Dilution (U/ 0.1cc): 1.5
Additional Area 2 Units: 10
Lot #: [id number]
Price (Use Numbers Only, No Special Characters Or $): 0.00
Detail Level: Simple
Additional Area 2 Location: lateral brows
Length Of Topical Anesthesia Application (Optional): 15 minutes

## 2022-01-31 ENCOUNTER — APPOINTMENT (RX ONLY)
Dept: URBAN - METROPOLITAN AREA CLINIC 23 | Facility: CLINIC | Age: 62
Setting detail: DERMATOLOGY
End: 2022-01-31

## 2022-01-31 DIAGNOSIS — Z41.9 ENCOUNTER FOR PROCEDURE FOR PURPOSES OTHER THAN REMEDYING HEALTH STATE, UNSPECIFIED: ICD-10-CM

## 2022-01-31 DIAGNOSIS — L82.0 INFLAMED SEBORRHEIC KERATOSIS: ICD-10-CM

## 2022-01-31 DIAGNOSIS — Z85.828 PERSONAL HISTORY OF OTHER MALIGNANT NEOPLASM OF SKIN: ICD-10-CM

## 2022-01-31 PROCEDURE — ? COUNSELING

## 2022-01-31 PROCEDURE — ? BENIGN DESTRUCTION

## 2022-01-31 PROCEDURE — 17110 DESTRUCTION B9 LES UP TO 14: CPT

## 2022-01-31 PROCEDURE — ? SUNSCREEN RECOMMENDATIONS

## 2022-01-31 PROCEDURE — ? LIQUID NITROGEN

## 2022-01-31 PROCEDURE — ? FILLERS

## 2022-01-31 PROCEDURE — 99212 OFFICE O/P EST SF 10 MIN: CPT | Mod: 25

## 2022-01-31 PROCEDURE — ? DYSPORT

## 2022-01-31 ASSESSMENT — LOCATION DETAILED DESCRIPTION DERM
LOCATION DETAILED: RIGHT RIB CAGE
LOCATION DETAILED: SUBXIPHOID
LOCATION DETAILED: RIGHT INFERIOR ANTERIOR NECK
LOCATION DETAILED: LEFT RIB CAGE
LOCATION DETAILED: LEFT INFERIOR ANTERIOR NECK

## 2022-01-31 ASSESSMENT — LOCATION ZONE DERM
LOCATION ZONE: TRUNK
LOCATION ZONE: NECK

## 2022-01-31 ASSESSMENT — LOCATION SIMPLE DESCRIPTION DERM
LOCATION SIMPLE: RIGHT ANTERIOR NECK
LOCATION SIMPLE: ABDOMEN
LOCATION SIMPLE: LEFT ANTERIOR NECK

## 2022-01-31 NOTE — PROCEDURE: DYSPORT
[facility] Units: 0
Show Masseter Units: Yes
Additional Area 1 Location: lat brows
Show Right And Left Periorbital Units: No
Additional Area 3 Location: high forehead 3 cm above brows
Consent: Written consent obtained. Risks include but not limited to lid/brow ptosis, bruising, swelling, diplopia, temporary effect, incomplete chemical denervation.
Dilution (U/ 0.1cc): 1.5
Forehead Units: 12
Additional Area 5 Location: glabella
Lot #: [id number]
Additional Area 2 Location: crows feet
Price (Use Numbers Only, No Special Characters Or $): 0.00
Detail Level: Simple
Additional Area 4 Location: 2cm high forehead
Expiration Date (Month Year): 2022-08
Glabellar Complex Units: [address]

## 2022-01-31 NOTE — PROCEDURE: LIQUID NITROGEN
Show Applicator Variable?: Yes
Post-Care Instructions: I reviewed with the patient in detail post-care instructions. Patient is to wear sunprotection, and avoid picking at any of the treated lesions. Pt may apply Vaseline to crusted or scabbing areas.
Duration Of Freeze Thaw-Cycle (Seconds): 5-10
Number Of Freeze-Thaw Cycles: 2 freeze-thaw cycles
Medical Necessity Clause: This procedure was medically necessary because the lesions that were treated were:
Render Post-Care Instructions In Note?: no
Spray Paint Text: The liquid nitrogen was applied to the skin utilizing a spray paint frosting technique.
Consent: The patient's consent was obtained including but not limited to risks of crusting, scabbing, blistering, scarring, darker or lighter pigmentary change, recurrence, incomplete removal and infection.
Medical Necessity Information: It is in your best interest to select a reason for this procedure from the list below. All of these items fulfill various CMS LCD requirements except the new and changing color options.
Detail Level: Simple

## 2022-01-31 NOTE — PROCEDURE: FILLERS
Dorsal Hands Filler  Volume In Cc: 0
Additional Area 2 Location: Lips, oral commissure, glabellar fine fines
Use Map Statement For Sites (Optional): No
Additional Area 2 Volume In Cc: 1
Additional Area 2 Location: Nasalabial, Glabellar fine lines- Complimentary
Additional Area 2 Location: cheeks, oral commissure
Price (Use Numbers Only, No Special Characters Or $): 0.00
Additional Area 1 Location: lateral mouth, perioral fine lines, vermillion lips, marionette lines
Include Cannula Length?: 1.5 inch
Additional Area 1 Location: vermilion lips and oral commesure
Detail Level: Zone
Filler: Restylane-L
Include Cannula Size?: 25G
Lot #: [id number]
Additional Anesthesia Volume In Cc: 6
Expiration Date (Month Year): 2023-06
Lot #: [id number]
Expiration Date (Month Year): 2024-06
Vermilion Lips Filler Volume In Cc: 0.5
Expiration Date (Month Year): 08/31/2019
Include Cannula Brand?: DermaSculpt
Lot #: [id number]
Post-Care Instructions: Patient instructed to apply ice to reduce swelling.
Additional Area 5 Location: Cheeks, vermillion lips, marionette fine lines, glabellar fine lines, lateral mouth
Anesthesia Type: 1% lidocaine without epinephrine
Additional Area 4 Location: Perioral
Consent: Written consent obtained. Risks include but not limited to bruising, beading, irregular texture, ulceration, infection, allergic reaction, scar formation, incomplete augmentation, temporary nature, procedural pain.
Additional Area 4 Location: lateral jawline, lateral mouth, glabella lines,
Filler: Juvederm Volbella XC
Additional Area 3 Location: Glabbellar fine lines, Nasalabial folds, Marionette lines, vermillion lip
Map Statment: See [address] for Complete Details
Additional Area 1 Location: lateral mouth, fine lines perioral, marionette lines, lateral cheeks, vermillion lips

## 2022-01-31 NOTE — PROCEDURE: BENIGN DESTRUCTION
Add 52 Modifier (Optional): no
Consent: The patient's consent was obtained including but not limited to risks of crusting, scabbing, blistering, scarring, darker or lighter pigmentary change, recurrence, incomplete removal and infection.
Medical Necessity Clause: This procedure was medically necessary because the lesions that were treated were:
Medical Necessity Information: It is in your best interest to select a reason for this procedure from the list below. All of these items fulfill various CMS LCD requirements except the new and changing color options.
Post-Care Instructions: I reviewed with the patient in detail post-care instructions. Patient is to wear sunprotection, and avoid picking at any of the treated lesions. Pt may apply Vaseline to crusted or scabbing areas.
Render Post-Care Instructions In Note?: yes
Anesthesia Volume In Cc: 0.3
Detail Level: Simple
Treatment Number (Will Not Render If 0): 0

## 2022-10-26 NOTE — PROCEDURE: MIPS QUALITY
Quality 130: Documentation Of Current Medications In The Medical Record: Current Medications Documented
Detail Level: Detailed
Quality 110: Preventive Care And Screening: Influenza Immunization: Influenza immunization was not ordered or administered, reason not given
Quality 474: Zoster Vaccination Status: Shingrix Vaccination not Administered or Previously Received, Reason not Otherwise Specified
Quality 131: Pain Assessment And Follow-Up: Pain assessment using a standardized tool is documented as negative, no follow-up plan required
Metronidazole Pregnancy And Lactation Text: This medication is Pregnancy Category B and considered safe during pregnancy.  It is also excreted in breast milk.

## 2022-12-05 ENCOUNTER — APPOINTMENT (RX ONLY)
Dept: URBAN - METROPOLITAN AREA CLINIC 23 | Facility: CLINIC | Age: 62
Setting detail: DERMATOLOGY
End: 2022-12-05

## 2022-12-05 DIAGNOSIS — Z85.828 PERSONAL HISTORY OF OTHER MALIGNANT NEOPLASM OF SKIN: ICD-10-CM

## 2022-12-05 DIAGNOSIS — L82.0 INFLAMED SEBORRHEIC KERATOSIS: ICD-10-CM

## 2022-12-05 DIAGNOSIS — Z41.9 ENCOUNTER FOR PROCEDURE FOR PURPOSES OTHER THAN REMEDYING HEALTH STATE, UNSPECIFIED: ICD-10-CM

## 2022-12-05 DIAGNOSIS — L81.4 OTHER MELANIN HYPERPIGMENTATION: ICD-10-CM

## 2022-12-05 PROCEDURE — ? DYSPORT

## 2022-12-05 PROCEDURE — 17110 DESTRUCTION B9 LES UP TO 14: CPT

## 2022-12-05 PROCEDURE — 99212 OFFICE O/P EST SF 10 MIN: CPT | Mod: 25

## 2022-12-05 PROCEDURE — ? BENIGN DESTRUCTION

## 2022-12-05 PROCEDURE — ? COUNSELING

## 2022-12-05 ASSESSMENT — LOCATION DETAILED DESCRIPTION DERM: LOCATION DETAILED: LEFT MEDIAL MALAR CHEEK

## 2022-12-05 ASSESSMENT — LOCATION SIMPLE DESCRIPTION DERM: LOCATION SIMPLE: LEFT CHEEK

## 2022-12-05 ASSESSMENT — LOCATION ZONE DERM: LOCATION ZONE: FACE

## 2022-12-05 NOTE — PROCEDURE: BENIGN DESTRUCTION
Detail Level: Simple
Render Post-Care Instructions In Note?: yes
Add 52 Modifier (Optional): no
Treatment Number (Will Not Render If 0): 0
Anesthesia Volume In Cc: 0.3
Medical Necessity Information: It is in your best interest to select a reason for this procedure from the list below. All of these items fulfill various CMS LCD requirements except the new and changing color options.
Post-Care Instructions: I reviewed with the patient in detail post-care instructions. Patient is to wear sunprotection, and avoid picking at any of the treated lesions. Pt may apply Vaseline to crusted or scabbing areas.
Consent: The patient's consent was obtained including but not limited to risks of crusting, scabbing, blistering, scarring, darker or lighter pigmentary change, recurrence, incomplete removal and infection.
Medical Necessity Clause: This procedure was medically necessary because the lesions that were treated were:

## 2022-12-05 NOTE — PROCEDURE: DYSPORT
Additional Area 3 Location: lateral brows
Show Lcl Units: No
Show Additional Area 5: Yes
Masseter Units: 0
Additional Area 3 Units: 10
Topical Anesthesia?: 20% benzocaine, 8% lidocaine, 4% tetracaine
Dilution (U/ 0.1cc): 1.5
Length Of Topical Anesthesia Application (Optional): 15 minutes
Additional Area 4 Location: 2cm high forehead
Expiration Date (Month Year): 2022-08
Price (Use Numbers Only, No Special Characters Or $): 0.00
Additional Area 5 Location: glabella
Additional Area 1 Units: [address]
Detail Level: Simple
Additional Area 2 Location: crows feet
Lot #: [id number]
Consent: Written consent obtained. Risks include but not limited to lid/brow ptosis, bruising, swelling, diplopia, temporary effect, incomplete chemical denervation.
Show Inventory Tab: Show

## 2023-07-19 ENCOUNTER — APPOINTMENT (RX ONLY)
Dept: URBAN - METROPOLITAN AREA CLINIC 23 | Facility: CLINIC | Age: 63
Setting detail: DERMATOLOGY
End: 2023-07-19

## 2023-07-19 DIAGNOSIS — Z41.9 ENCOUNTER FOR PROCEDURE FOR PURPOSES OTHER THAN REMEDYING HEALTH STATE, UNSPECIFIED: ICD-10-CM

## 2023-07-19 PROCEDURE — ? DYSPORT

## 2023-07-19 PROCEDURE — ? RECOMMENDATIONS

## 2023-07-19 PROCEDURE — ? FILLERS

## 2023-07-19 NOTE — PROCEDURE: FILLERS
Dorsal Hands Filler  Volume In Cc: 0
Number Of Syringes (Required For Inventory): 1
Additional Area 1 Location: Nasolabial folds, marionette lines
Include Cannula Brand?: DermaSculpt
Additional Area 1 Location: lateral jaw
Filler: Restylane-L
Additional Area 2 Location: Upper lip fine lines, lateral cheeks, tear troughs.
Include Cannula Information In Note?: No
Consent: Written consent obtained. Risks include but not limited to bruising, beading, irregular texture, ulceration, infection, allergic reaction, scar formation, incomplete augmentation, temporary nature, procedural pain.
Include Cannula Size?: 25G
Post-Care Instructions: Patient instructed to apply ice to reduce swelling.
Additional Area 4 Location: upper lip , lower lip, lip stick lines, tear troughs,cheeks
Include Cannula Length?: 1.5 inch
Map Statment: See [address] for Complete Details
Include Cannula Information In Note?: Yes
Additional Area 5 Location: upper lips and lower,fine lines
Additional Area 1 Location: lateral cheeks,jawline
Additional Area 1 Location: chin, left earlobe
Inventory Information: This plan will send filler information to inventory based on the fillers you select. Multiple fillers can be sent but you must ensure you select the appropriate fillers in the inventory tab.
Additional Area 2 Location: suni oral, fine lines, marionette lines, nasal folds
Detail Level: Detailed
Anesthesia Volume In Cc: 0.5
Additional Area 3 Location: right hand
Additional Area 4 Location: suni oral
Show Inventory Tab: Show

## 2023-07-19 NOTE — PROCEDURE: RECOMMENDATIONS
Detail Level: Detailed
Render Risk Assessment In Note?: no
Recommendations (Free Text): IPL full face $450.00

## 2023-07-19 NOTE — PROCEDURE: DYSPORT
Show Anterior Platysmal Band Units: Yes
Additional Area 5 Units: 0
Show Right And Left Periorbital Units: No
Expiration Date (Month Year): 2022-10
Glabellar Complex Units: [address]
Additional Area 6 Location: lip flip
Additional Area 1 Location: lateral brows
Additional Area 1 Units: 10
Show Inventory Tab: Show
Consent: Written consent obtained. Risks include but not limited to lid/brow ptosis, bruising, swelling, diplopia, temporary effect, incomplete chemical denervation.
Additional Area 2 Location: high forehead
Post-Care Instructions: Patient instructed to not lie down for 4 hours, limit physical activity for 24 hours and avoid manipulation of the treated areas.
Additional Area 4 Location: bunny lines
Lot #: [id number]
Additional Area 3 Location: glabella
Dilution (U/ 0.1cc): 1.5
Detail Level: Zone
Additional Area 5 Location: brows

## 2023-07-26 ENCOUNTER — APPOINTMENT (RX ONLY)
Dept: URBAN - METROPOLITAN AREA CLINIC 23 | Facility: CLINIC | Age: 63
Setting detail: DERMATOLOGY
End: 2023-07-26

## 2023-07-26 DIAGNOSIS — Z41.9 ENCOUNTER FOR PROCEDURE FOR PURPOSES OTHER THAN REMEDYING HEALTH STATE, UNSPECIFIED: ICD-10-CM

## 2023-07-26 PROCEDURE — ? HYDRAFACIAL

## 2023-07-26 NOTE — PROCEDURE: HYDRAFACIAL
Tip Override: yellow
Solution: GlySal 7.5%
Number Of Passes: 0
Tip: Hydropeel Tip, Clear
Location: face
Procedure: Extraction
Solution: Beta-HD
Consent: Written consent obtained, risks reviewed including but not limited to crusting, scabbing, blistering, scarring, darker or lighter pigmentary change, bruising, and/or incomplete response.
Tip Override
Procedure: Fusion
Location Override: scalp
Treatment Number: 1
Price (Use Numbers Only, No Special Characters Or $): Fernando 346
Tip: Hydropeel Tip, Teal
Tip: Hydropeel Tip, Blue
Solution: Antiox-6
Solution Override
Post-Care Instructions: I reviewed with the patient in detail post-care instructions. Patient should stay away from the sun and wear sun protection until treated areas are fully healed.
Procedure: Peel
Indication: skin texture
Procedure: Exfoliation

## 2023-10-06 ENCOUNTER — APPOINTMENT (RX ONLY)
Dept: URBAN - METROPOLITAN AREA CLINIC 23 | Facility: CLINIC | Age: 63
Setting detail: DERMATOLOGY
End: 2023-10-06

## 2023-10-06 DIAGNOSIS — Z41.9 ENCOUNTER FOR PROCEDURE FOR PURPOSES OTHER THAN REMEDYING HEALTH STATE, UNSPECIFIED: ICD-10-CM

## 2023-10-06 PROCEDURE — ? HYDRAFACIAL

## 2023-10-06 NOTE — PROCEDURE: HYDRAFACIAL
Vacuum Pressure High Setting (Will Not Render If Set To 0): 0
Solution Override
Procedure: Exfoliation
Solution: GlySal 7.5%
Post-Care Instructions: I reviewed with the patient in detail post-care instructions. Patient should stay away from the sun and wear sun protection until treated areas are fully healed.
Procedure: Extraction
Location: face
Tip Override
Solution: Beta-HD
Procedure: Fusion
Location Override: scalp
Tip Override: yellow
Tip: Hydropeel Tip, Clear
Tip: Hydropeel Tip, Teal
Price (Use Numbers Only, No Special Characters Or $): Fernando [id number]
Treatment Number: 1
Solution: Antiox-6
Indication: skin texture
Procedure: Peel
Consent: Written consent obtained, risks reviewed including but not limited to crusting, scabbing, blistering, scarring, darker or lighter pigmentary change, bruising, and/or incomplete response.
Tip: Hydropeel Tip, Blue

## 2024-01-12 ENCOUNTER — APPOINTMENT (RX ONLY)
Dept: URBAN - METROPOLITAN AREA CLINIC 23 | Facility: CLINIC | Age: 64
Setting detail: DERMATOLOGY
End: 2024-01-12

## 2024-01-12 DIAGNOSIS — Z41.9 ENCOUNTER FOR PROCEDURE FOR PURPOSES OTHER THAN REMEDYING HEALTH STATE, UNSPECIFIED: ICD-10-CM

## 2024-01-12 DIAGNOSIS — L82.0 INFLAMED SEBORRHEIC KERATOSIS: ICD-10-CM

## 2024-01-12 PROCEDURE — ? COUNSELING

## 2024-01-12 PROCEDURE — 17110 DESTRUCTION B9 LES UP TO 14: CPT

## 2024-01-12 PROCEDURE — ? BENIGN DESTRUCTION

## 2024-01-12 PROCEDURE — ? RECOMMENDATIONS

## 2024-01-12 PROCEDURE — ? DYSPORT

## 2024-01-12 ASSESSMENT — LOCATION SIMPLE DESCRIPTION DERM
LOCATION SIMPLE: NOSE
LOCATION SIMPLE: RIGHT NOSE

## 2024-01-12 ASSESSMENT — LOCATION DETAILED DESCRIPTION DERM
LOCATION DETAILED: NASAL DORSUM
LOCATION DETAILED: NASAL TIP
LOCATION DETAILED: RIGHT NASAL SIDEWALL

## 2024-01-12 ASSESSMENT — LOCATION ZONE DERM: LOCATION ZONE: NOSE

## 2024-01-12 NOTE — PROCEDURE: DYSPORT
Nasal Root Units: 0
Expiration Date (Month Year): 2022-10
Additional Area 6 Location: neck bands
Show Glabellar Units: Yes
Additional Area 3 Location: forehead (touch up)
Show Ucl Units: No
Additional Area 4 Location: Neckbands
Lot #: [id number]
Additional Area 1 Location: Chin
Glabellar Complex Units: 50
Show Inventory Tab: Show
Dilution (U/0.1 Cc): 1.5
Additional Area 5 Location: upper lip
Consent: Written consent obtained. Risks include but not limited to lid/brow ptosis, bruising, swelling, diplopia, temporary effect, incomplete chemical denervation.
Reconstitution Date (Optional): lateral brows-10
Post-Care Instructions: Patient instructed to not lie down for 4 hours, limit physical activity for 24 hours and avoid manipulation of the treated areas.
Detail Level: Zone

## 2024-01-12 NOTE — PROCEDURE: RECOMMENDATIONS
Render Risk Assessment In Note?: no
Detail Level: Detailed
Recommendations (Free Text): Alto defense in the morning \\nTNS at bedtime.

## 2024-01-12 NOTE — PROCEDURE: BENIGN DESTRUCTION
Detail Level: Detailed
Medical Necessity Clause: This procedure was medically necessary because the lesions that were treated were:
Include Z78.9 (Other Specified Conditions Influencing Health Status) As An Associated Diagnosis?: No
Post-Care Instructions: I reviewed with the patient in detail post-care instructions. Patient is to wear sunprotection, and avoid picking at any of the treated lesions. Pt may apply Vaseline to crusted or scabbing areas.
Treatment Number (Will Not Render If 0): 5
Anesthesia Volume In Cc: 0.2
Medical Necessity Information: It is in your best interest to select a reason for this procedure from the list below. All of these items fulfill various CMS LCD requirements except the new and changing color options.
Consent: The patient's consent was obtained including but not limited to risks of crusting, scabbing, blistering, scarring, darker or lighter pigmentary change, recurrence, incomplete removal and infection.

## 2024-01-17 ENCOUNTER — APPOINTMENT (RX ONLY)
Dept: URBAN - METROPOLITAN AREA CLINIC 23 | Facility: CLINIC | Age: 64
Setting detail: DERMATOLOGY
End: 2024-01-17

## 2024-01-17 DIAGNOSIS — Z41.9 ENCOUNTER FOR PROCEDURE FOR PURPOSES OTHER THAN REMEDYING HEALTH STATE, UNSPECIFIED: ICD-10-CM

## 2024-01-17 PROCEDURE — ? DELUXE HYDRAFACIAL

## 2024-01-17 NOTE — PROCEDURE: DELUXE HYDRAFACIAL
Indication: skin texture
Vacuum Pressure High Setting (Will Not Render If Set To 0): 0
Tip: Hydropeel Tip, Blue
Tip Override
Procedure: Exfoliation
Tip: Hydropeel Tip, Teal
Procedure: Extend and Protect
Solution Override
Procedure: Peel
Consent: Written consent obtained, risks reviewed including but not limited to crusting, scabbing, blistering, scarring, darker or lighter pigmentary change, bruising, and/or incomplete response.
Procedure: Extraction
Tip: Hydropeel Tip, Clear
Post-Care Instructions: I reviewed with the patient in detail post-care instructions. Patient should stay away from the sun and wear sun protection until treated areas are fully healed.
Location: face
Solution: GlySal 7.5%
Solution: Beta-HD
Solution: Activ-4
Procedure: Fusion
Price (Use Numbers Only, No Special Characters Or $): 108
Treatment Number: 1
Procedure: Boost

## 2024-02-13 ENCOUNTER — APPOINTMENT (RX ONLY)
Dept: URBAN - METROPOLITAN AREA CLINIC 23 | Facility: CLINIC | Age: 64
Setting detail: DERMATOLOGY
End: 2024-02-13

## 2024-02-13 DIAGNOSIS — Z41.9 ENCOUNTER FOR PROCEDURE FOR PURPOSES OTHER THAN REMEDYING HEALTH STATE, UNSPECIFIED: ICD-10-CM

## 2024-02-13 PROCEDURE — ? PICOWAY LASER

## 2024-02-13 NOTE — PROCEDURE: PICOWAY LASER
Post-Procedure Care: Immediate endpoint: perifollicular erythema and edema. Vaseline and ice applied. Post care reviewed with patient.
Treatment Number: 0
Treatment Number: 1
Post-Care Instructions: I reviewed with the patient in detail post-care instructions. Patient should avoid sun for a minimum of 4 weeks before and after treatment.
Fluence (J/Cm2): 2
Wavelength: 532 nm
Spot Size: 4.0 mm
Anesthesia Type: 1% lidocaine with epinephrine
Handpiece: Zoom
Detail Level: Detailed
Hide Pulse Duration?: No
Spot Size: 6.5 mm
Total Pulses (Settings #3): 2 passes 725 pulse
Location Override: Chest spots, nose spots, right arm spot
Spot Size: 2.0 mm
Price (Use Numbers Only, No Special Characters Or $): 264
Fluence (J/Cm2): 0.7
Fluence (J/Cm2): 0.6
Pulse Duration: 2.5 ms
Pre-Procedure: Prior to proceeding the treatment areas were cleaned and all present put on their eye protection.
Consent: Written consent obtained, risks reviewed including but not limited to crusting, scabbing, blistering, scarring, darker or lighter pigmentary change, paradoxical hair regrowth, incomplete removal of hair and infection.
Frequency (Hz): 1hz
Location Override: Face brown spots

## 2024-03-04 ENCOUNTER — APPOINTMENT (RX ONLY)
Dept: URBAN - METROPOLITAN AREA CLINIC 23 | Facility: CLINIC | Age: 64
Setting detail: DERMATOLOGY
End: 2024-03-04

## 2024-03-04 DIAGNOSIS — Z41.9 ENCOUNTER FOR PROCEDURE FOR PURPOSES OTHER THAN REMEDYING HEALTH STATE, UNSPECIFIED: ICD-10-CM

## 2024-03-04 PROCEDURE — ? DELUXE HYDRAFACIAL

## 2024-03-04 NOTE — PROCEDURE: DELUXE HYDRAFACIAL
Tip: Hydropeel Tip, Teal
Procedure: Exfoliation
Consent: Written consent obtained, risks reviewed including but not limited to crusting, scabbing, blistering, scarring, darker or lighter pigmentary change, bruising, and/or incomplete response.
Vacuum Pressure High Setting (Will Not Render If Set To 0): 0
Post-Care Instructions: I reviewed with the patient in detail post-care instructions. Patient should stay away from the sun and wear sun protection until treated areas are fully healed.
Tip: Hydropeel Tip, Clear
Location: face
Solution Override
Procedure: Fusion
Procedure: Extraction
Solution: Activ-4
Solution: Beta-HD
Solution: GlySal 7.5%
Tip Override
Treatment Number: 1
Procedure: Peel
Price (Use Numbers Only, No Special Characters Or $): 122
Procedure: Boost
Indication: skin texture
Tip: Hydropeel Tip, Blue
Procedure: Extend and Protect

## 2024-03-12 ENCOUNTER — APPOINTMENT (RX ONLY)
Dept: URBAN - METROPOLITAN AREA CLINIC 23 | Facility: CLINIC | Age: 64
Setting detail: DERMATOLOGY
End: 2024-03-12

## 2024-03-12 DIAGNOSIS — Z41.9 ENCOUNTER FOR PROCEDURE FOR PURPOSES OTHER THAN REMEDYING HEALTH STATE, UNSPECIFIED: ICD-10-CM

## 2024-03-12 DIAGNOSIS — L82.0 INFLAMED SEBORRHEIC KERATOSIS: ICD-10-CM

## 2024-03-12 PROCEDURE — 17110 DESTRUCTION B9 LES UP TO 14: CPT

## 2024-03-12 PROCEDURE — ? COUNSELING

## 2024-03-12 PROCEDURE — ? BENIGN DESTRUCTION

## 2024-03-12 PROCEDURE — ? ADDITIONAL NOTES

## 2024-03-12 ASSESSMENT — LOCATION ZONE DERM: LOCATION ZONE: NOSE

## 2024-03-12 ASSESSMENT — LOCATION SIMPLE DESCRIPTION DERM: LOCATION SIMPLE: RIGHT NOSE

## 2024-03-12 ASSESSMENT — LOCATION DETAILED DESCRIPTION DERM: LOCATION DETAILED: RIGHT NASAL SIDEWALL

## 2024-03-12 NOTE — PROCEDURE: BENIGN DESTRUCTION
Medical Necessity Clause: This procedure was medically necessary because the lesions that were treated were:
Treatment Number (Will Not Render If 0): 1
Detail Level: Simple
Anesthesia Volume In Cc: 0.3
Post-Care Instructions: I reviewed with the patient in detail post-care instructions. Patient is to wear sunprotection, and avoid picking at any of the treated lesions. Pt may apply Vaseline to crusted or scabbing areas.
Include Z78.9 (Other Specified Conditions Influencing Health Status) As An Associated Diagnosis?: No
Render Post-Care Instructions In Note?: yes
Anesthesia Type: 1% Xylocaine with epinephrine
Medical Necessity Information: It is in your best interest to select a reason for this procedure from the list below. All of these items fulfill various CMS LCD requirements except the new and changing color options.
Consent: The patient's consent was obtained including but not limited to risks of crusting, scabbing, blistering, scarring, darker or lighter pigmentary change, recurrence, incomplete removal and infection.

## 2024-03-12 NOTE — PROCEDURE: ADDITIONAL NOTES
Detail Level: Detailed
Render Risk Assessment In Note?: no
Additional Notes: Physician sample NC done on glabella line.

## 2024-05-09 ENCOUNTER — APPOINTMENT (RX ONLY)
Dept: URBAN - METROPOLITAN AREA CLINIC 23 | Facility: CLINIC | Age: 64
Setting detail: DERMATOLOGY
End: 2024-05-09

## 2024-05-09 DIAGNOSIS — Z41.9 ENCOUNTER FOR PROCEDURE FOR PURPOSES OTHER THAN REMEDYING HEALTH STATE, UNSPECIFIED: ICD-10-CM

## 2024-05-09 PROCEDURE — ? DERMAPLANE

## 2024-05-09 PROCEDURE — ? SIGNATURE HYDRAFACIAL

## 2024-05-09 ASSESSMENT — LOCATION SIMPLE DESCRIPTION DERM: LOCATION SIMPLE: LEFT CHEEK

## 2024-05-09 ASSESSMENT — LOCATION DETAILED DESCRIPTION DERM: LOCATION DETAILED: LEFT INFERIOR CENTRAL MALAR CHEEK

## 2024-05-09 ASSESSMENT — LOCATION ZONE DERM: LOCATION ZONE: FACE

## 2024-05-09 NOTE — PROCEDURE: SIGNATURE HYDRAFACIAL
Vacuum Pressure Low Setting (Will Not Render If Set To 0): 0
Tip: Hydropeel Tip, Teal
Solution: GlySal 7.5%
Procedure: Fusion
Treatment Number: 1
Consent: Written consent obtained, risks reviewed including but not limited to crusting, scabbing, blistering, scarring, darker or lighter pigmentary change, bruising, and/or incomplete response.
Price (Use Numbers Only, No Special Characters Or $): 175
Tip Override
Procedure: Extraction
Tip: Hydropeel Tip, Blue
Solution Override
Procedure: Extend and Protect
Tip: Hydropeel Tip, Clear
Post-Care Instructions: I reviewed with the patient in detail post-care instructions. Patient should stay away from the sun and wear sun protection until treated areas are fully healed.
Procedure: Boost
Solution: Beta-HD
Indication: skin texture
Procedure: Exfoliation
Location: face
Solution: Activ-4
Procedure: Peel

## 2024-05-09 NOTE — PROCEDURE: DERMAPLANE
Blade: 10 blade scalpel
Treatment Areas: face
Pre-Procedure Text: The patient was placed in a recumbant position on the procedure table.
Post-Care Instructions: I reviewed with the patient in detail post-care instructions.
Price (Use Numbers Only, No Special Characters Or $): 100
Post-Procedure Instructions: Following the dermaplane procedure, Oxymist treatment was applied to the treatment areas. Moisturizer and SPF was applied.
Treatment Area Prep: alcohol
Detail Level: Zone

## 2024-07-19 ENCOUNTER — APPOINTMENT (RX ONLY)
Dept: URBAN - METROPOLITAN AREA CLINIC 23 | Facility: CLINIC | Age: 64
Setting detail: DERMATOLOGY
End: 2024-07-19

## 2024-07-19 VITALS — HEIGHT: 70 IN | WEIGHT: 145 LBS

## 2024-07-19 DIAGNOSIS — L57.0 ACTINIC KERATOSIS: ICD-10-CM

## 2024-07-19 DIAGNOSIS — L82.0 INFLAMED SEBORRHEIC KERATOSIS: ICD-10-CM

## 2024-07-19 PROCEDURE — 17000 DESTRUCT PREMALG LESION: CPT | Mod: 59

## 2024-07-19 PROCEDURE — 17110 DESTRUCTION B9 LES UP TO 14: CPT

## 2024-07-19 PROCEDURE — ? LIQUID NITROGEN

## 2024-07-19 PROCEDURE — ? COUNSELING

## 2024-07-19 PROCEDURE — ? BENIGN DESTRUCTION

## 2024-07-19 PROCEDURE — 99213 OFFICE O/P EST LOW 20 MIN: CPT | Mod: 25

## 2024-07-19 ASSESSMENT — LOCATION DETAILED DESCRIPTION DERM
LOCATION DETAILED: STERNAL NOTCH
LOCATION DETAILED: RIGHT FOREHEAD
LOCATION DETAILED: LEFT MEDIAL SUPERIOR CHEST
LOCATION DETAILED: RIGHT MEDIAL SUPERIOR CHEST

## 2024-07-19 ASSESSMENT — LOCATION ZONE DERM
LOCATION ZONE: TRUNK
LOCATION ZONE: FACE

## 2024-07-19 ASSESSMENT — LOCATION SIMPLE DESCRIPTION DERM
LOCATION SIMPLE: CHEST
LOCATION SIMPLE: RIGHT FOREHEAD

## 2024-07-19 NOTE — PROCEDURE: LIQUID NITROGEN
Detail Level: Detailed
Post-Care Instructions: I reviewed with the patient in detail post-care instructions. Patient is to wear sunprotection, and avoid picking at any of the treated lesions. Pt may apply Vaseline to crusted or scabbing areas.
Render Note In Bullet Format When Appropriate: No
Show Applicator Variable?: Yes
Duration Of Freeze Thaw-Cycle (Seconds): 10
Consent: The patient's consent was obtained including but not limited to risks of crusting, scabbing, blistering, scarring, darker or lighter pigmentary change, recurrence, incomplete removal and infection.
Number Of Freeze-Thaw Cycles: 1 freeze-thaw cycle
no back pain, no gout, no musculoskeletal pain, no neck pain.

## 2024-07-19 NOTE — PROCEDURE: BENIGN DESTRUCTION
Detail Level: Detailed
Medical Necessity Information: It is in your best interest to select a reason for this procedure from the list below. All of these items fulfill various CMS LCD requirements except the new and changing color options.
Include Z78.9 (Other Specified Conditions Influencing Health Status) As An Associated Diagnosis?: No
Treatment Number (Will Not Render If 0): 0
Medical Necessity Clause: This procedure was medically necessary because the lesions that were treated were:
Consent: The patient's consent was obtained including but not limited to risks of crusting, scabbing, blistering, scarring, darker or lighter pigmentary change, recurrence, incomplete removal and infection.
Post-Care Instructions: I reviewed with the patient in detail post-care instructions. Patient is to wear sunprotection, and avoid picking at any of the treated lesions. Pt may apply Vaseline to crusted or scabbing areas.

## 2024-07-29 ENCOUNTER — APPOINTMENT (RX ONLY)
Dept: URBAN - METROPOLITAN AREA CLINIC 23 | Facility: CLINIC | Age: 64
Setting detail: DERMATOLOGY
End: 2024-07-29

## 2024-07-29 DIAGNOSIS — Z41.9 ENCOUNTER FOR PROCEDURE FOR PURPOSES OTHER THAN REMEDYING HEALTH STATE, UNSPECIFIED: ICD-10-CM

## 2024-07-29 PROCEDURE — ? DELUXE HYDRAFACIAL

## 2024-07-29 NOTE — PROCEDURE: DELUXE HYDRAFACIAL
Vacuum Pressure Low Setting (Will Not Render If Set To 0): 0
Consent: Written consent obtained, risks reviewed including but not limited to crusting, scabbing, blistering, scarring, darker or lighter pigmentary change, bruising, and/or incomplete response.
Post-Care Instructions: I reviewed with the patient in detail post-care instructions. Patient should stay away from the sun and wear sun protection until treated areas are fully healed.
Solution: Activ-4
Tip: Hydropeel Tip, Teal
Procedure: Fusion
Tip: Hydropeel Tip, Clear
Procedure: Boost
Price (Use Numbers Only, No Special Characters Or $): 917
Procedure: Extraction
Treatment Number: 1
Procedure: Peel
Indication: skin texture
Tip Override
Solution Override
Solution: Beta-HD
Procedure: Extend and Protect
Tip: Hydropeel Tip, Blue
Procedure: Exfoliation
Location: face
Solution: GlySal 7.5%

## 2024-10-03 ENCOUNTER — APPOINTMENT (RX ONLY)
Dept: URBAN - METROPOLITAN AREA CLINIC 23 | Facility: CLINIC | Age: 64
Setting detail: DERMATOLOGY
End: 2024-10-03

## 2024-10-03 DIAGNOSIS — Z41.9 ENCOUNTER FOR PROCEDURE FOR PURPOSES OTHER THAN REMEDYING HEALTH STATE, UNSPECIFIED: ICD-10-CM

## 2024-10-03 PROCEDURE — ? FILLERS

## 2024-10-03 PROCEDURE — ? BOTOX

## 2024-10-03 NOTE — PROCEDURE: FILLERS
Dorsal Hands Filler Volume In Cc: 0
Additional Area 1 Location: glabella, cheeks, nlfs, marionette, perioral fine lines
Number Of Syringes (Required For Inventory): 1
Show Inventory Tab: Show
Consent: Written consent obtained. Risks include but not limited to bruising, beading, irregular texture, ulceration, infection, allergic reaction, scar formation, incomplete augmentation, temporary nature, procedural pain.
Include Cannula Information In Note?: No
Filler: Restylane-L
Post-Care Instructions: Patient instructed to apply ice to reduce swelling.
Additional Area 1 Location: lateral mouth, perioral fine lines, marionette lines.
Map Statment: See Attach Map for Complete Details
Anesthesia Type: 1% lidocaine with epinephrine
Anesthesia Volume In Cc: 0.5
Additional Anesthesia Volume In Cc: 6
Inventory Information: This plan will send filler information to inventory based on the fillers you select. Multiple fillers can be sent but you must ensure you select the appropriate fillers in the inventory tab.
Detail Level: Detailed

## 2024-10-03 NOTE — PROCEDURE: BOTOX
Depressor Anguli Oris Units: 0
Show Right And Left Pupillary Line Units: No
Expiration Date (Month Year): 2024-03
Additional Area 5 Location: high forehead 1.5cm above brows
Incrementing Botox Units: By 0.5 Units
Show Additional Area 4: Yes
Additional Area 2 Location: chin
Detail Level: Zone
Price (Use Numbers Only, No Special Characters Or $): 0.00
Glabellar Complex Units: 20
Lot #: [id number]
Additional Area 1 Location: lateral brows
Show Inventory Tab: Show
Consent: Written consent obtained. Risks include but not limited to lid/brow ptosis, bruising, swelling, diplopia, temporary effect, incomplete chemical denervation.
Additional Area 1 Units: 4
Dilution (U/0.1 Cc): 2.5
Additional Area 3 Location: high forehead 2 cm above brows
Additional Area 6 Location: Replaced by [facility].

## 2025-02-10 ENCOUNTER — APPOINTMENT (OUTPATIENT)
Dept: URBAN - METROPOLITAN AREA CLINIC 23 | Facility: CLINIC | Age: 65
Setting detail: DERMATOLOGY
End: 2025-02-10

## 2025-02-10 DIAGNOSIS — Z41.9 ENCOUNTER FOR PROCEDURE FOR PURPOSES OTHER THAN REMEDYING HEALTH STATE, UNSPECIFIED: ICD-10-CM

## 2025-02-10 PROCEDURE — ? FILLERS

## 2025-02-10 PROCEDURE — ? BOTOX

## 2025-02-10 NOTE — PROCEDURE: FILLERS
Mid Face Filler Volume In Cc: 0
Additional Area 2 Location: cheeks, nlfs, marionette lines, glabella
Inventory Information: This plan will send filler information to inventory based on the fillers you select. Multiple fillers can be sent but you must ensure you select the appropriate fillers in the inventory tab.
Filler: Restylane-L
Additional Area 2 Volume In Cc: 1
Detail Level: Detailed
Show Inventory Tab: Show
Include Cannula Information In Note?: No
Additional Area 1 Location: lateral mouth, perioral fine lines, marionette lines.
Post-Care Instructions: Patient instructed to apply ice to reduce swelling.
Consent: Written consent obtained. Risks include but not limited to bruising, beading, irregular texture, ulceration, infection, allergic reaction, scar formation, incomplete augmentation, temporary nature, procedural pain.
Anesthesia Type: 1% lidocaine with epinephrine
Map Statment: See Attach Map for Complete Details
Anesthesia Volume In Cc: 0.5
Additional Anesthesia Volume In Cc: 6

## 2025-02-10 NOTE — PROCEDURE: BOTOX
Show Right And Left Periorbital Units: No
Levator Labii Superioris Units: 0
Additional Area 6 Location: [facility].
Show Masseter Units: Yes
Additional Area 3 Location: high forehead 2 cm above brows
Incrementing Botox Units: By 0.5 Units
Detail Level: Zone
Expiration Date (Month Year): 2024-03
Additional Area 5 Location: high forehead 1.5cm above brows
Additional Area 2 Location: lateral brows
Glabellar Complex Units: 18
Show Inventory Tab: Show
Additional Area 2 Units: 6
Lot #: [id number]
Price (Use Numbers Only, No Special Characters Or $): 0.00
Additional Area 1 Location: right brow (special)
Consent: Written consent obtained. Risks include but not limited to lid/brow ptosis, bruising, swelling, diplopia, temporary effect, incomplete chemical denervation.
Dilution (U/0.1 Cc): 2.5

## 2025-06-20 ENCOUNTER — APPOINTMENT (OUTPATIENT)
Dept: URBAN - METROPOLITAN AREA CLINIC 23 | Facility: CLINIC | Age: 65
Setting detail: DERMATOLOGY
End: 2025-06-20

## 2025-06-20 DIAGNOSIS — Z41.9 ENCOUNTER FOR PROCEDURE FOR PURPOSES OTHER THAN REMEDYING HEALTH STATE, UNSPECIFIED: ICD-10-CM

## 2025-06-20 PROCEDURE — ? FILLERS

## 2025-06-20 NOTE — PROCEDURE: FILLERS
Include Cannula Information In Note?: Yes
Number Of Syringes (Required For Inventory): 1
Decollete Filler Volume In Cc: 0
Additional Area 2 Location: marionette lines, oral commesure and jawline
Show Inventory Tab: Show
Additional Area 1 Location: chin dimples
Additional Area 3 Location: jawline, nlfs, marionette lines
Include Cannula Brand?: DermaSculpt
Include Cannula Information In Note?: No
Additional Area 2 Location: suni oral
Additional Area 4 Location: jawline, cheeks
Additional Area 1 Location: glabella
Include Cannula Size?: 25G
Additional Area 3 Location: cheeks, jawline
Additional Area 1 Volume In Cc: 0.5
Additional Area 5 Location: ear lobes
Additional Area 2 Location: cheeks, glabella lines, perioral fine lines
Consent: Written consent obtained. Risks include but not limited to bruising, beading, irregular texture, ulceration, infection, allergic reaction, scar formation, incomplete augmentation, temporary nature, procedural pain.
Filler: Restylane Eyelight
Additional Area 1 Location: lateral jaw
Additional Area 3 Location: neck, jawline, temples
Post-Care Instructions: Patient instructed to apply ice to reduce swelling.
Additional Area 2 Location: suni oral fine lines
Additional Area 4 Location: perioral fine lines, marionette lines, oral commissures
Include Cannula Length?: 1.5 inch
Map Statment: See Attach Map for Complete Details
Inventory Information: This plan will send filler information to inventory based on the fillers you select. Multiple fillers can be sent but you must ensure you select the appropriate fillers in the inventory tab.
Additional Area 5 Location: left temple, cheeks
Detail Level: Detailed
Additional Area 1 Location: marionette lines, cheeks, upper lip, perioral fine lines.